# Patient Record
Sex: FEMALE | Race: WHITE | NOT HISPANIC OR LATINO | Employment: UNEMPLOYED | URBAN - METROPOLITAN AREA
[De-identification: names, ages, dates, MRNs, and addresses within clinical notes are randomized per-mention and may not be internally consistent; named-entity substitution may affect disease eponyms.]

---

## 2023-07-27 ENCOUNTER — HOSPITAL ENCOUNTER (EMERGENCY)
Facility: HOSPITAL | Age: 28
Discharge: HOME | End: 2023-07-27
Attending: EMERGENCY MEDICINE
Payer: MEDICAID

## 2023-07-27 VITALS
RESPIRATION RATE: 12 BRPM | BODY MASS INDEX: 29.02 KG/M2 | OXYGEN SATURATION: 100 % | HEIGHT: 64 IN | SYSTOLIC BLOOD PRESSURE: 132 MMHG | TEMPERATURE: 98 F | HEART RATE: 82 BPM | DIASTOLIC BLOOD PRESSURE: 86 MMHG | WEIGHT: 170 LBS

## 2023-07-27 DIAGNOSIS — L03.90 WOUND CELLULITIS: Primary | ICD-10-CM

## 2023-07-27 DIAGNOSIS — I96 SKIN NECROSIS: ICD-10-CM

## 2023-07-27 PROCEDURE — 99283 EMERGENCY DEPT VISIT LOW MDM: CPT

## 2023-07-27 PROCEDURE — 25000003 PHARM REV CODE 250: Performed by: PHYSICIAN ASSISTANT

## 2023-07-27 RX ORDER — DOXYCYCLINE HYCLATE 100 MG
100 TABLET ORAL
Status: COMPLETED | OUTPATIENT
Start: 2023-07-27 | End: 2023-07-27

## 2023-07-27 RX ORDER — DOXYCYCLINE 100 MG/1
100 CAPSULE ORAL 2 TIMES DAILY
Qty: 14 CAPSULE | Refills: 0 | Status: SHIPPED | OUTPATIENT
Start: 2023-07-27 | End: 2023-08-03

## 2023-07-27 RX ADMIN — DOXYCYCLINE HYCLATE 100 MG: 100 TABLET, COATED ORAL at 10:07

## 2023-07-28 NOTE — DISCHARGE INSTRUCTIONS
Monitor for signs of worsening infection including of erythema, increased amount of pain, purulent drainage, red streaking lines from your wound, fever, decreased range of motion of your finger  You may take Tylenol over-the-counter as needed for pain  Please follow-up with orthopedic hand surgery and wound care as discussed.  Referral placed today  You were prescribed an antibiotic for infection.  Please take the entire course even if your symptoms start to improve.   Keep area clean and dry.  You may clean wound with an antimicrobial soap such as dial  Return to the emergency department for new or concerning symptoms

## 2023-07-28 NOTE — ED TRIAGE NOTES
Pt cut 2nd digit of R hand on lemon slicer Sunday & went to urgent care. States they cauterized the wound on Sunday, and pt now concerned for infection. Not on antibiotics, and denies fever. Pain is 6/10. Skin is black around wound. No drainage noted.

## 2023-07-28 NOTE — ED PROVIDER NOTES
Encounter Date: 7/27/2023       History     Chief Complaint   Patient presents with    Hand Pain     Pt arrives c/o pain to 2nd digit of right hand. Pt cut it the other day and urgent care cauterized it. Pt mainly concerned that it may be infected.     27-year-old female with no pertinent past medical history presents to ED for wound check.  She has an abrasion to her right index finger that occurred 4 days ago.  She went to urgent care and silver nitrate was used to cauterize the abrasion.  She is concerned because her friend said her finger looked necrotic and infected.  She denies changes in appearance since cauterization was performed.  She notes a slight increase in pain since procedure.  Denies drainage, fever, streaking erythema.    The history is provided by the patient.   Review of patient's allergies indicates:   Allergen Reactions    Sulfa (sulfonamide antibiotics) Rash     No past medical history on file.  No past surgical history on file.  No family history on file.  Social History     Tobacco Use    Smoking status: Never    Smokeless tobacco: Never   Substance Use Topics    Alcohol use: Yes    Drug use: Not Currently     Review of Systems   Constitutional:  Negative for fever.   Respiratory:  Negative for wheezing.    Cardiovascular:  Negative for chest pain.   Musculoskeletal:  Negative for arthralgias and myalgias.   Skin:  Positive for color change and wound. Negative for rash.     Physical Exam     Initial Vitals [07/27/23 2119]   BP Pulse Resp Temp SpO2   132/86 82 12 98.4 °F (36.9 °C) 100 %      MAP       --         Physical Exam    Nursing note and vitals reviewed.  Constitutional: She appears well-developed and well-nourished. She is not diaphoretic. No distress.   HENT:   Head: Normocephalic and atraumatic.   Nose: Nose normal.   Eyes: Conjunctivae and EOM are normal.   Neck: Neck supple.   Cardiovascular:  Normal rate.           Pulmonary/Chest: No respiratory distress.   Musculoskeletal:       Left wrist: Normal pulse.      Left hand: No swelling. Normal range of motion.      Cervical back: Neck supple.      Comments: Wound noted to lateral aspect right index finger.  Hyperpigmented color due to cauterization.  Appears necrotic.  Small amount of erythema surrounding cauterized area. Nontender to palpation.  No drainage to pressure.  She has full ROM of right index finger.  No finger swelling     Neurological: She is alert and oriented to person, place, and time. Gait normal.   Skin: No rash noted.   Psychiatric: She has a normal mood and affect. Thought content normal.                 ED Course   Procedures  Labs Reviewed   HIV 1 / 2 ANTIBODY   HEPATITIS C ANTIBODY          Imaging Results    None          Medications   doxycycline tablet 100 mg (100 mg Oral Given 7/27/23 2231)     Medical Decision Making:   Initial Assessment:   27-year-old female with no pertinent past medical history presents to ED for wound check.  Hemodynamically stable.  Afebrile.  Differential Diagnosis:   Abrasion, cellulitis, routine healing  ED Management:  I believe cauterization caused change in wound color.  Patient Denies changes in color since procedure.  Slightly erythematous surrounding area which may be new.  Patient has picture and it appears similar.  Will treat for wounds cellulitis today with doxycycline.  Advised Patient to take this medication with meals to prevent gastritis.  I offered her a tetanus vaccine as she is not up-to-date though she declined at this time.  Discussed complications of tetanus including death.  She has full range of motion of her finger.  She denies fever.  Discussed with Orthopedic surgery.  They do not believe that emergent intervention/surgical debridement is indicated at this time.  Will give referral with hand surgery and wound care. She is stable at this time for discharge.  Discussed signs of worsening infection with patient. ED precautions were given to return for new or worsening  symptoms  Other:   I discussed test(s) with the performing physician.                        Clinical Impression:   Final diagnoses:  [L03.90] Wound cellulitis (Primary)  [I96] Skin necrosis        ED Disposition Condition    Discharge Stable          ED Prescriptions       Medication Sig Dispense Start Date End Date Auth. Provider    doxycycline (VIBRAMYCIN) 100 MG Cap Take 1 capsule (100 mg total) by mouth 2 (two) times daily. for 7 days 14 capsule 7/27/2023 8/3/2023 Yvette Benjamin PA-C          Follow-up Information       Follow up With Specialties Details Why Contact Info    [facility] - Emergency Dept Emergency Medicine  If symptoms worsen [address] [id number] [phone]    [facility] Surgical Oncology, Orthopedic Surgery, Genetics, Physical Medicine and Rehabilitation, Occupational Therapy, Radiology Schedule an appointment as soon as possible for a visit   [address] [facility] [id number]  [phone]      [facility] - Wound Care Wound Care Schedule an appointment as soon as possible for a visit   [address] [facility] [address]  [phone]               Yvette Benjamin PA-C  07/27/23 3264